# Patient Record
Sex: MALE
[De-identification: names, ages, dates, MRNs, and addresses within clinical notes are randomized per-mention and may not be internally consistent; named-entity substitution may affect disease eponyms.]

---

## 2020-08-25 ENCOUNTER — TRANSCRIPTION ENCOUNTER (OUTPATIENT)
Age: 30
End: 2020-08-25

## 2021-07-19 ENCOUNTER — TRANSCRIPTION ENCOUNTER (OUTPATIENT)
Age: 31
End: 2021-07-19

## 2023-06-08 ENCOUNTER — APPOINTMENT (OUTPATIENT)
Dept: UROLOGY | Facility: CLINIC | Age: 33
End: 2023-06-08
Payer: COMMERCIAL

## 2023-06-08 VITALS
OXYGEN SATURATION: 94 % | WEIGHT: 150 LBS | DIASTOLIC BLOOD PRESSURE: 86 MMHG | TEMPERATURE: 97.8 F | HEART RATE: 79 BPM | SYSTOLIC BLOOD PRESSURE: 122 MMHG | HEIGHT: 65 IN | RESPIRATION RATE: 15 BRPM | BODY MASS INDEX: 24.99 KG/M2

## 2023-06-08 PROBLEM — Z00.00 ENCOUNTER FOR PREVENTIVE HEALTH EXAMINATION: Status: ACTIVE | Noted: 2023-06-08

## 2023-06-08 PROCEDURE — 99204 OFFICE O/P NEW MOD 45 MIN: CPT

## 2023-06-08 RX ORDER — TADALAFIL 5 MG/1
5 TABLET ORAL
Qty: 10 | Refills: 0 | Status: ACTIVE | COMMUNITY
Start: 2023-06-08 | End: 1900-01-01

## 2023-06-11 NOTE — HISTORY OF PRESENT ILLNESS
[FreeTextEntry1] : Language: English\par Date of First visit: 06/08/2023 \par Accompanied by: self\par Contact info: \par Referring Provider/PCP: Dr. Viji De Leon\par Fax: \par \par \par CC/ Problem List:\par ED, low libido\par \par ===============================================================================\par FIRST VISIT / Summary:\par Very pleasant 32 year old M here for ED and low libido that began 1 yr ago possibly due to opioid abuse. Now on Suboxone and has not noticed any improvement w/ his ED or libido. Also started SSRI about 5 months ago\par \par He was seen by his PCP Viji De Leon ([facility]) 3 months ago who prescribed 14 day supply of low dose Viagra which was ineffective.\par He mentions that his erections are here and there, not strong, rates it at about 6/10. Expresses having no desire to masturbate.\par \par He admits to Urinary symptoms hesitancy and delayed start that are not bothersome.\par \par \par \par -------------------------------------------------------------------------------------------\par INTERVAL VISITS:\par \par ===============================================================================\par \par PMH: HTN, anxiety and depression\par Meds: Prozac, amlodipine, Viagra\par All: NKA\par FHx: denies\par SocHx: nonsmoker, weekly ETOH, in a relationship, \par \par PSH: appendectomy, Right inguinal hernia repair\par \par \par ROS: Review of Systems is as per HPI unless otherwise denoted below\par \par \par ===============================================================================\par DATA: \par \par LABS (SELECTED):---------------------------------------------------------------------------------------------------\par 4/2023 testosterone total 342 ng/dL viewed on phone no free T\par \par RADS:-------------------------------------------------------------------------------------------------------------------\par \par \par PATHOLOGY/CYTOLOGY:-------------------------------------------------------------------------------------------\par \par \par VOIDING STUDIES: ----------------------------------------------------------------------------------------------------\par \par \par STONE STUDIES: (Analysis/LLSA)----------------------------------------------------------------------------------\par \par \par PROCEDURES: -----------------------------------------------------------------------------------------------\par \par \par \par \par ===============================================================================\par \par PHYSICAL EXAM:\par \par GEN: AAOx3, NAD\par \par PSYCH: Appropriate Behavior, Affect Congruent\par \par Lungs: No labored breathing\par \par GAIT: Gait normal, Stability good\par \par ABD: no suprapubic or CVAT\par \par \par  FOCUSED: ----------------------------------------------------------------------------------------------------------------\par Declined \par \par =======================================================================================\par DISCUSSION: \par The patient has ED.    \par I explained that there are different causes for ED. They include psychogenic, vasculogenic and neurogenic causes. Often times it is a mix. One of my largest concerns is that young men with VASCULOGENIC ED are at higher risk for CAD and coronary events at a younger age, since the process causing vasculogenic ED and CAD is the same. I therefore recommend that my young patients with ED be tested for etiology.    ED can be caused by stress, hypogonadism, HTN, DM, ESRD, neurologic and vascular issues. Other causes of ED include significant alcohol use, drug use, smoking, and barbituate/amphetamine use.    Many medications can cause ED as well including antihypertensives (such as Triamterine, Lasix, Catapres, Verapamil, Nifedpine, Hydralazine, beta blockers like Metoprolol, Clonidine, Methyldopa, Hydralazine, ACE-I such as Captopril, phenoxybenazmine, and spironolactone), anti-depressants/anxiety/epileptic drugs (such as prozac, zoloft, elavil, buspirone, buspar, librium, valium, Ativan, Dilantin, Anafranil), antihistamines, NSAID's, Parkinson's medications (including Sinemet, Bromocriptine), H2 receptor antagonists (Tagamet, Axid, Zantac), Muscle relaxants, Prostate cancer meds and Chemotherapy agents (Cytoxan).    We discussed treatment options which do include discontinuing meds that can cause ED, lifestyle modification, DM/HTN control, stress modification and medical/injectable and surgical options.    He is on the following meds which may cause ED: SSRI/amlodipine\par \par PDE5 inhibitor information:\par The risks of this medication include facial redness and/or flushing, reflux (indigestion), headache, back pain, an erection that won't go away, chest pain or heart attack, dizziness, drop in blood pressure, loss of vision, blue vision, blurry vision and loss of hearing. The patient understands that he cannot take together with nitrates and that should he develop chest pain, he must alert emergency personnel if he has taken within the last 24 hours.  \par \par I recommend that you take the first dose by yourself so you can get acquainted with how this medication makes you feel and whether or not you will have any of the above side effects. The medication may work as soon as 20 minutes on an empty stomach but it can take up to 60 minutes (or longer) on a full stomach. The medication does requires sexual stimulation to work. If you take it without sexual stimulation, you will not get an erection. Please come to the ER for an erection that won't go away, chest pain, or loss of vision or hearing. The patient understood all of these instructions.\par \par The patient has been screened for potential medication interactions. He is not on any po antifungals or HIV meds (protease inhibitors, NNRTI's). \par  \par =======================================================================================\par ASSESSMENT and PLAN\par \par \par 1. low libido\par hormonal labs- advised to have blood work done in the morning as Testosterone peaks in the morning \par A1c, T total/free, LH recommended\par \par 2. ED\par can consider tadalafil\par F/u with PCP and psychiatrist to consider switching amlodipine\par Mental health follow up and when able reduce dose of SSRI\par \par \par \par \par =======================================================================================\par The total time personally spent preparing for this visit (reviewing test results, obtaining external history) and during the visit (ordering tests/medications, spent face to face with the patient / family and counseling them on the above), as well as after the visit (on clinical documentation and coordination with other care providers) was approximately 45 minutes. \par Thank you for allowing me to assist in the care of your patient. Should you have any questions please do not hesitate to reach out to me.\par \par \par Sachin Culp MD                                                            \par [facility]\par [facility]\par \par [place] Office:\par [address], [address]   \[place] [address]    \par T: [phone]    \par F: [phone]    \par \par [place] Office:\par [address] floor\[place] [address]\par T: [phone]\par F: [phone]

## 2023-07-09 ENCOUNTER — TRANSCRIPTION ENCOUNTER (OUTPATIENT)
Age: 33
End: 2023-07-09

## 2023-07-18 LAB
ESTIMATED AVERAGE GLUCOSE: 100 MG/DL
ESTRADIOL SERPL-MCNC: 10 PG/ML
FSH SERPL-MCNC: 5.7 IU/L
HBA1C MFR BLD HPLC: 5.1 %
LH SERPL-ACNC: 4.7 IU/L

## 2023-07-19 DIAGNOSIS — R79.89 OTHER SPECIFIED ABNORMAL FINDINGS OF BLOOD CHEMISTRY: ICD-10-CM

## 2023-07-19 LAB
PROLACTIN SERPL-MCNC: 27.7 NG/ML
TESTOST FREE SERPL-MCNC: 6.5 PG/ML
TESTOST SERPL-MCNC: 449 NG/DL

## 2023-07-21 ENCOUNTER — RESULT REVIEW (OUTPATIENT)
Age: 33
End: 2023-07-21

## 2023-07-25 ENCOUNTER — APPOINTMENT (OUTPATIENT)
Dept: UROLOGY | Facility: CLINIC | Age: 33
End: 2023-07-25
Payer: COMMERCIAL

## 2023-07-25 PROCEDURE — 99443: CPT

## 2023-07-25 RX ORDER — CLOMIPHENE CITRATE 100 %
POWDER (GRAM) MISCELLANEOUS
Qty: 10 | Refills: 11 | Status: ACTIVE | COMMUNITY
Start: 2023-07-25 | End: 1900-01-01

## 2023-07-25 NOTE — HISTORY OF PRESENT ILLNESS
[FreeTextEntry1] : Language: English\par Date of First visit: 06/08/2023 \par Accompanied by: self\par Contact info: \par Referring Provider/PCP: Dr. Viji De Leon\par Fax: \par \par \par CC/ Problem List:\par ED, low libido\par \par ===============================================================================\par FIRST VISIT / Summary:\par Very pleasant 32 year old M here for ED and low libido that began 1 yr ago possibly due to opioid abuse. Now on Suboxone and has not noticed any improvement w/ his ED or libido. Also started SSRI about 5 months ago\par \par He was seen by his PCP Viji De Leon ([facility]) 3 months ago who prescribed 14 day supply of low dose Viagra which was ineffective.\par He mentions that his erections are here and there, not strong, rates it at about 6/10. Expresses having no desire to masturbate.\par \par He admits to Urinary symptoms hesitancy and delayed start that are not bothersome.\par \par -------------------------------------------------------------------------------------------\par INTERVAL VISITS:\par \par The patient's medications and allergies were reviewed and edited below. Dated 07/25/2023 \par \par Discussed lab results. He will get MRI. Clomiphene discussed he would like to start if MRI normal. Otherwise advised to wait until specialist weighs in.\par ===============================================================================\par \par PMH: HTN, anxiety and depression\par Meds: Prozac, amlodipine, Viagra\par All: NKA\par FHx: denies\par SocHx: nonsmoker, weekly ETOH, in a relationship, \par \par PSH: appendectomy, Right inguinal hernia repair\par \par \par ROS: Review of Systems is as per HPI unless otherwise denoted below\par \par \par ===============================================================================\par DATA: \par \par LABS (SELECTED):---------------------------------------------------------------------------------------------------\par 4/2023 testosterone total 342 ng/dL viewed on phone no free T\par \par RADS:-------------------------------------------------------------------------------------------------------------------\par 7/19/2023: MR pituitary pending given elevated prolactin and low free T.\par \par PATHOLOGY/CYTOLOGY:-------------------------------------------------------------------------------------------\par \par \par VOIDING STUDIES: ----------------------------------------------------------------------------------------------------\par \par \par STONE STUDIES: (Analysis/LLSA)----------------------------------------------------------------------------------\par \par \par PROCEDURES: -----------------------------------------------------------------------------------------------\par \par \par \par \par ===============================================================================\par \par PHYSICAL EXAM:\par \par GEN: AAOx3, NAD\par \par PSYCH: Appropriate Behavior, Affect Congruent\par \par Lungs: No labored breathing\par \par GAIT: Gait normal, Stability good\par \par ABD: no suprapubic or CVAT\par \par \par  FOCUSED: ----------------------------------------------------------------------------------------------------------------\par Declined \par \par =======================================================================================\par DISCUSSION: \par  \par =======================================================================================\par ASSESSMENT and PLAN\par \par \par 1. Elevated Prolactin / Low T\par - prolactin elevated and low free T. LH and FSH normal. Estradiol Low.\par - brain MRI pituitary\par - if normal, start clomiphene citrate 25mg\par \par 2. ED\par can consider tadalafil\par F/u with PCP and psychiatrist to consider switching amlodipine\par Mental health follow up and when able reduce dose of SSRI\par \par \par =======================================================================================\par The total time personally spent preparing for this visit (reviewing test results, obtaining external history) and during the visit (ordering tests/medications, spent face to face with the patient / family and counseling them on the above), as well as after the visit (on clinical documentation and coordination with other care providers) was approximately  minutes. \par Thank you for allowing me to assist in the care of your patient. Should you have any questions please do not hesitate to reach out to me.\par \par \par Sachin Culp MD    \par [facility]\par [facility]\par \par [place] Office:\par [address], [address] \[place] [address] \par T: [phone] \par F: [phone] \par \par [place] Office:\par [address], [address]st [place] [address]\par T: [phone]\par F: [phone]

## 2023-09-13 ENCOUNTER — APPOINTMENT (OUTPATIENT)
Dept: MRI IMAGING | Facility: CLINIC | Age: 33
End: 2023-09-13
Payer: COMMERCIAL

## 2023-09-13 ENCOUNTER — OUTPATIENT (OUTPATIENT)
Dept: OUTPATIENT SERVICES | Facility: HOSPITAL | Age: 33
LOS: 1 days | End: 2023-09-13

## 2023-09-13 PROCEDURE — 70553 MRI BRAIN STEM W/O & W/DYE: CPT | Mod: 26

## 2024-01-18 ENCOUNTER — TRANSCRIPTION ENCOUNTER (OUTPATIENT)
Age: 34
End: 2024-01-18

## 2024-01-24 ENCOUNTER — APPOINTMENT (OUTPATIENT)
Dept: UROLOGY | Facility: CLINIC | Age: 34
End: 2024-01-24
Payer: COMMERCIAL

## 2024-01-24 VITALS
TEMPERATURE: 98 F | HEIGHT: 65 IN | OXYGEN SATURATION: 92 % | SYSTOLIC BLOOD PRESSURE: 132 MMHG | DIASTOLIC BLOOD PRESSURE: 87 MMHG | HEART RATE: 83 BPM | BODY MASS INDEX: 24.99 KG/M2 | WEIGHT: 150 LBS | RESPIRATION RATE: 15 BRPM

## 2024-01-24 DIAGNOSIS — N52.9 MALE ERECTILE DYSFUNCTION, UNSPECIFIED: ICD-10-CM

## 2024-01-24 DIAGNOSIS — R79.89 OTHER SPECIFIED ABNORMAL FINDINGS OF BLOOD CHEMISTRY: ICD-10-CM

## 2024-01-24 PROCEDURE — 99215 OFFICE O/P EST HI 40 MIN: CPT

## 2024-01-24 RX ORDER — TAMSULOSIN HYDROCHLORIDE 0.4 MG/1
0.4 CAPSULE ORAL
Qty: 90 | Refills: 3 | Status: ACTIVE | COMMUNITY
Start: 2024-01-24 | End: 1900-01-01

## 2024-01-24 RX ORDER — TADALAFIL 10 MG/1
10 TABLET, FILM COATED ORAL
Qty: 10 | Refills: 3 | Status: ACTIVE | COMMUNITY
Start: 2024-01-24 | End: 1900-01-01

## 2024-01-24 NOTE — HISTORY OF PRESENT ILLNESS
[FreeTextEntry1] : Language: English Date of First visit: 06/08/2023 Accompanied by: self Contact info: Referring Provider/PCP: Dr. Viji De Leon Fax: [phone]  CC/ Problem List: ED, low libido, LUTS  =============================================================================== FIRST VISIT / Summary: Very pleasant 32 year old M here for ED and low libido that began 1 yr ago possibly due to opioid abuse. Now on Suboxone and has not noticed any improvement w/ his ED or libido. Also started SSRI about 5 months ago  He was seen by his PCP Viji De Leon ([facility]) 3 months ago who prescribed 14 day supply of low dose Viagra which was ineffective. He mentions that his erections are here and there, not strong, rates it at about 6/10. Expresses having no desire to masturbate.  He admits to Urinary symptoms hesitancy and delayed start that are not bothersome. ------------------------------------------------------------------------------------------- INTERVAL VISITS: The patient's medications and allergies were reviewed and edited below. Dated 01/24/2024   Discussed lab results. He tried clomiphene without much change and did not refill.   LUTS: some constipation, slow flow and hesitation. some straining. No dysuria, no incontinence or hematuria ===============================================================================  PMH: HTN, anxiety and depression Meds: Prozac, amlodipine, Viagra, suboxone All: NKA FHx: denies SocHx: nonsmoker, weekly ETOH, in a relationship,   PSH: appendectomy, Right inguinal hernia repair  ROS: Review of Systems is as per HPI unless otherwise denoted below  =============================================================================== DATA: LABS (SELECTED):--------------------------------------------------------------------------------------------------- 4/2023 testosterone total 342 ng/dL viewed on phone no free T 7/17/23: prolactin 27.7 Testosterone 449/6.5.  RADS:------------------------------------------------------------------------------------------------------------------- 7/19/2023: MR pituitary with likely right pituitary microadenoma 5mm in size.  PATHOLOGY/CYTOLOGY:-------------------------------------------------------------------------------------------   VOIDING STUDIES: ----------------------------------------------------------------------------------------------------   STONE STUDIES: (Analysis/LLSA)----------------------------------------------------------------------------------   PROCEDURES: -----------------------------------------------------------------------------------------------    =============================================================================== PHYSICAL EXAM:    FOCUSED: ---------------------------------------------------------------------------------------------------------------- Declined   ======================================================================================= DISCUSSION:  PDE5 inhibitor information: The risks of this medication include facial redness and/or flushing, reflux (indigestion), headache, back pain, an erection that won't go away, chest pain or heart attack, dizziness, drop in blood pressure, loss of vision, blue vision, blurry vision and loss of hearing. The patient understands that he cannot take together with nitrates and that should he develop chest pain, he must alert emergency personnel if he has taken within the last 24 hours.    I recommend that you take the first dose by yourself so you can get acquainted with how this medication makes you feel and whether or not you will have any of the above side effects. The medication may work as soon as 20 minutes on an empty stomach but it can take up to 60 minutes (or longer) on a full stomach. The medication does requires sexual stimulation to work. If you take it without sexual stimulation, you will not get an erection. Please come to the ER for an erection that won't go away, chest pain, or loss of vision or hearing. The patient understood all of these instructions.  The patient has been screened for potential medication interactions. He is not on any po antifungals or HIV meds (protease inhibitors, NNRTI's).   ======================================================================================= ASSESSMENT and PLAN  1. Elevated Prolactin / Low T - prolactin elevated and low free T. LH and FSH normal. Estradiol Low. - brain MRI pituitary with microadenoma. Rec Endocrinology follow-up - started clomiphene citrate 25mg - stopped due to  no effect - repeat T tests in am  2. ED - try tadalafil 10 mg given minimal response to 5mg  3. proteinuria - reported by outside physician - he will see nephrology when he can - already had referral  4. LUTS - has hesitancy and slow flow - trial tamsulosin  Sachin Culp MD                                                             [facility]  [place] Office: [address], [address]    [place] [address]     T: [phone]     F: [phone]      [place] Office: [address] [place] [address], 1st [place] [address] T: [phone] F: [phone]    =======================================================================================  The total time personally spent preparing for this visit (reviewing test results, obtaining external history) and during the visit (ordering tests/medications, spent face to face with the patient / family and counseling them on the above), as well as after the visit (on clinical documentation and coordination with other care providers) was approximately 45 minutes.  Thank you for allowing me to assist in the care of your patient. Should you have any questions please do not hesitate to reach out to me.

## 2024-01-28 ENCOUNTER — TRANSCRIPTION ENCOUNTER (OUTPATIENT)
Age: 34
End: 2024-01-28

## 2024-01-30 ENCOUNTER — TRANSCRIPTION ENCOUNTER (OUTPATIENT)
Age: 34
End: 2024-01-30

## 2024-02-14 ENCOUNTER — TRANSCRIPTION ENCOUNTER (OUTPATIENT)
Age: 34
End: 2024-02-14

## 2024-02-15 ENCOUNTER — TRANSCRIPTION ENCOUNTER (OUTPATIENT)
Age: 34
End: 2024-02-15

## 2024-02-16 ENCOUNTER — TRANSCRIPTION ENCOUNTER (OUTPATIENT)
Age: 34
End: 2024-02-16

## 2024-02-16 RX ORDER — CLOMIPHENE CITRATE 50 MG/1
50 TABLET ORAL DAILY
Qty: 90 | Refills: 3 | Status: ACTIVE | COMMUNITY
Start: 2024-02-16 | End: 1900-01-01

## 2024-02-23 ENCOUNTER — TRANSCRIPTION ENCOUNTER (OUTPATIENT)
Age: 34
End: 2024-02-23